# Patient Record
Sex: MALE | Race: WHITE | NOT HISPANIC OR LATINO | Employment: UNEMPLOYED | URBAN - METROPOLITAN AREA
[De-identification: names, ages, dates, MRNs, and addresses within clinical notes are randomized per-mention and may not be internally consistent; named-entity substitution may affect disease eponyms.]

---
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
## 2025-04-17 ENCOUNTER — APPOINTMENT (OUTPATIENT)
Dept: GENERAL RADIOLOGY | Facility: HOSPITAL | Age: 20
End: 2025-04-17
Payer: MEDICAID

## 2025-04-17 ENCOUNTER — HOSPITAL ENCOUNTER (EMERGENCY)
Facility: HOSPITAL | Age: 20
Discharge: HOME | End: 2025-04-17
Payer: MEDICAID

## 2025-04-17 ENCOUNTER — APPOINTMENT (OUTPATIENT)
Dept: CT IMAGING | Facility: HOSPITAL | Age: 20
End: 2025-04-17
Payer: MEDICAID

## 2025-04-17 VITALS
HEART RATE: 102 BPM | SYSTOLIC BLOOD PRESSURE: 131 MMHG | WEIGHT: 208.78 LBS | BODY MASS INDEX: 29.89 KG/M2 | RESPIRATION RATE: 16 BRPM | OXYGEN SATURATION: 96 % | HEIGHT: 70 IN | DIASTOLIC BLOOD PRESSURE: 89 MMHG | TEMPERATURE: 98.2 F

## 2025-04-17 DIAGNOSIS — M25.511 ACUTE PAIN OF RIGHT SHOULDER: ICD-10-CM

## 2025-04-17 DIAGNOSIS — D72.829 LEUKOCYTOSIS, UNSPECIFIED TYPE: ICD-10-CM

## 2025-04-17 DIAGNOSIS — S43.014A ANTERIOR DISLOCATION OF RIGHT SHOULDER, INITIAL ENCOUNTER: ICD-10-CM
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
## 2025-04-17 DIAGNOSIS — S00.93XA CONTUSION OF HEAD, UNSPECIFIED PART OF HEAD, INITIAL ENCOUNTER: ICD-10-CM

## 2025-04-17 LAB
ANION GAP SERPL CALCULATED.3IONS-SCNC: 14 MMOL/L (ref 5–15)
BACTERIA UR QL AUTO: NORMAL /HPF
BASOPHILS # BLD AUTO: 0.07 10*3/MM3 (ref 0–0.2)
BASOPHILS NFR BLD AUTO: 0.3 % (ref 0–1.5)
BILIRUB UR QL STRIP: NEGATIVE
BUN SERPL-MCNC: 12 MG/DL (ref 6–20)
BUN/CREAT SERPL: 10.6 (ref 7–25)
CALCIUM SPEC-SCNC: 9.6 MG/DL (ref 8.6–10.5)
CHLORIDE SERPL-SCNC: 102 MMOL/L (ref 98–107)
CLARITY UR: ABNORMAL
CO2 SERPL-SCNC: 22 MMOL/L (ref 22–29)
COLOR UR: YELLOW
CREAT SERPL-MCNC: 1.13 MG/DL (ref 0.76–1.27)
DEPRECATED RDW RBC AUTO: 38 FL (ref 37–54)
EGFRCR SERPLBLD CKD-EPI 2021: 96 ML/MIN/1.73
EOSINOPHIL # BLD AUTO: 0.03 10*3/MM3 (ref 0–0.4)
EOSINOPHIL NFR BLD AUTO: 0.1 % (ref 0.3–6.2)
ERYTHROCYTE [DISTWIDTH] IN BLOOD BY AUTOMATED COUNT: 12.2 % (ref 12.3–15.4)
GLUCOSE SERPL-MCNC: 111 MG/DL (ref 65–99)
GLUCOSE UR STRIP-MCNC: NEGATIVE MG/DL
HCT VFR BLD AUTO: 46.5 % (ref 37.5–51)
HGB BLD-MCNC: 15.3 G/DL (ref 13–17.7)
HGB UR QL STRIP.AUTO: ABNORMAL
HYALINE CASTS UR QL AUTO: NORMAL /LPF
IMM GRANULOCYTES # BLD AUTO: 0.18 10*3/MM3 (ref 0–0.05)
IMM GRANULOCYTES NFR BLD AUTO: 0.7 % (ref 0–0.5)
KETONES UR QL STRIP: ABNORMAL
LEUKOCYTE ESTERASE UR QL STRIP.AUTO: NEGATIVE
LYMPHOCYTES # BLD AUTO: 2.16 10*3/MM3 (ref 0.7–3.1)
LYMPHOCYTES NFR BLD AUTO: 8 % (ref 19.6–45.3)
MCH RBC QN AUTO: 28.2 PG (ref 26.6–33)
MCHC RBC AUTO-ENTMCNC: 32.9 G/DL (ref 31.5–35.7)
MCV RBC AUTO: 85.6 FL (ref 79–97)
MONOCYTES # BLD AUTO: 1.32 10*3/MM3 (ref 0.1–0.9)
MONOCYTES NFR BLD AUTO: 4.9 % (ref 5–12)
NEUTROPHILS NFR BLD AUTO: 23.11 10*3/MM3 (ref 1.7–7)
NEUTROPHILS NFR BLD AUTO: 86 % (ref 42.7–76)
NITRITE UR QL STRIP: NEGATIVE
NRBC BLD AUTO-RTO: 0 /100 WBC (ref 0–0.2)
PH UR STRIP.AUTO: 5.5 [PH] (ref 5–8)
PLATELET # BLD AUTO: 356 10*3/MM3 (ref 140–450)
PMV BLD AUTO: 9.1 FL (ref 6–12)
POTASSIUM SERPL-SCNC: 3.7 MMOL/L (ref 3.5–5.2)
PROT UR QL STRIP: ABNORMAL
RBC # BLD AUTO: 5.43 10*6/MM3 (ref 4.14–5.8)
RBC # UR STRIP: NORMAL /HPF
REF LAB TEST METHOD: NORMAL
SODIUM SERPL-SCNC: 138 MMOL/L (ref 136–145)
SP GR UR STRIP: 1.02 (ref 1–1.03)
SQUAMOUS #/AREA URNS HPF: NORMAL /HPF
UROBILINOGEN UR QL STRIP: ABNORMAL
WBC # UR STRIP: NORMAL /HPF
WBC NRBC COR # BLD AUTO: 26.87 10*3/MM3 (ref 3.4–10.8)

## 2025-04-17 PROCEDURE — 80048 BASIC METABOLIC PNL TOTAL CA: CPT

## 2025-04-17 PROCEDURE — 85025 COMPLETE CBC W/AUTO DIFF WBC: CPT

## 2025-04-17 PROCEDURE — 70450 CT HEAD/BRAIN W/O DYE: CPT

## 2025-04-17 PROCEDURE — 71045 X-RAY EXAM CHEST 1 VIEW: CPT

## 2025-04-17 PROCEDURE — 96374 THER/PROPH/DIAG INJ IV PUSH: CPT

## 2025-04-17 PROCEDURE — 25010000002 MORPHINE PER 10 MG

## 2025-04-17 PROCEDURE — 73030 X-RAY EXAM OF SHOULDER: CPT

## 2025-04-17 PROCEDURE — 96375 TX/PRO/DX INJ NEW DRUG ADDON: CPT

## 2025-04-17 PROCEDURE — 99285 EMERGENCY DEPT VISIT HI MDM: CPT

## 2025-04-17 PROCEDURE — 81001 URINALYSIS AUTO W/SCOPE: CPT

## 2025-04-17 PROCEDURE — 25010000002 ONDANSETRON PER 1 MG

## 2025-04-17 RX ORDER — HYDROCODONE BITARTRATE AND ACETAMINOPHEN 5; 325 MG/1; MG/1
1 TABLET ORAL EVERY 6 HOURS PRN
Qty: 12 TABLET | Refills: 0 | Status: SHIPPED | OUTPATIENT
Start: 2025-04-17 | End: 2025-04-20

## 2025-04-17 RX ORDER — SODIUM CHLORIDE 0.9 % (FLUSH) 0.9 %
10 SYRINGE (ML) INJECTION AS NEEDED
Status: DISCONTINUED | OUTPATIENT
Start: 2025-04-17 | End: 2025-04-18 | Stop reason: HOSPADM

## 2025-04-17 RX ORDER — ETOMIDATE 2 MG/ML
0.3 INJECTION INTRAVENOUS ONCE
Status: COMPLETED | OUTPATIENT
Start: 2025-04-17 | End: 2025-04-17

## 2025-04-17 RX ORDER — ONDANSETRON 2 MG/ML
4 INJECTION INTRAMUSCULAR; INTRAVENOUS ONCE
Status: COMPLETED | OUTPATIENT
Start: 2025-04-17 | End: 2025-04-17

## 2025-04-17 RX ORDER — HYDROCODONE BITARTRATE AND ACETAMINOPHEN 5; 325 MG/1; MG/1
1 TABLET ORAL ONCE
Refills: 0 | Status: COMPLETED | OUTPATIENT
Start: 2025-04-17 | End: 2025-04-17

## 2025-04-17 RX ADMIN — HYDROCODONE BITARTRATE AND ACETAMINOPHEN 1 TABLET: 5; 325 TABLET ORAL at 23:36

## 2025-04-17 RX ADMIN — MORPHINE SULFATE 4 MG: 4 INJECTION, SOLUTION INTRAMUSCULAR; INTRAVENOUS at 20:31

## 2025-04-17 RX ADMIN — ONDANSETRON 4 MG: 2 INJECTION, SOLUTION INTRAMUSCULAR; INTRAVENOUS at 20:31

## 2025-04-17 RX ADMIN — ETOMIDATE 20 MG: 20 INJECTION, SOLUTION INTRAVENOUS at 21:07

## 2025-04-18 NOTE — ED NOTES
Room set up for conscious sedation. O2 at 2L applied to patient with capnography. Patient placed on cardiac monitor. Bag valve mask at bedside. Suction set up and prepared.

## 2025-04-18 NOTE — DISCHARGE INSTRUCTIONS
Take the Norco as needed for pain.  This is a narcotic pain medications you do not drive or operate heavy machinery.  May use ibuprofen as well, follow package instructions.  Continue to wear the sling    Follow-up with primary care, Ortho and neurology.  Try to follow-up with your neurologist but may use Dr. Seipel if needed    Return with any new or worsening symptoms

## 2025-04-18 NOTE — ED NOTES
Patient reports having seizure and falling off bed while sleeping. Patient states he woke up when he hit the floor and his right shoulder was injured. Patient reports that he had surgery on right shoulder before. Patient can wiggle fingers on right side but cannot lift arm without pain. Patient denies numbness or tingling on RUE.

## 2025-04-18 NOTE — ED PROVIDER NOTES
Subjective   History of Present Illness  19-year-old male with history of right shoulder dislocation presents the ED today because he fell off of his bed while he was sleeping approximately 2 hours prior to arrival and feels that he redislocated his right shoulder.  Patient has dislocated his shoulder 5 times prior to this, had surgery on the shoulder in December 2024.  Reports mild tingling to the right arm due to pain.  Patient reports that his dad states he might have had a seizure however there was nobody to witness this.  Patient is on Zonegran for seizures and takes this as prescribed, last seizure was about a year ago.  Patient did hit the left side of his head on the ground, believes the fall will come up. Does report a mild headache but states it has improved.  Denies chest pain, shortness of breath, abdominal pain, dizziness, changes in vision, fever, cough/congestion, UTI symptoms    PCP: Vladimir  Ortho: [facility]        Review of Systems   Constitutional:  Negative for fever.   Respiratory:  Negative for shortness of breath.    Cardiovascular:  Negative for chest pain.   Gastrointestinal:  Negative for abdominal pain.   Musculoskeletal:  Positive for arthralgias and myalgias.   Neurological:  Positive for headaches. Negative for dizziness.       Past Medical History:   Diagnosis Date    Shoulder dislocation, recurrent, right        Allergies   Allergen Reactions    Nuts Rash    Whole Egg (Diagnostic) Hives       Past Surgical History:   Procedure Laterality Date    NO PAST SURGERIES      SHOULDER ARTHROSCOPY Right 12/18/2024    Procedure: SHOULDER ARTHROSCOPY BANKART LABRAL REPAIR;  Surgeon: Hardy Eaton MD;  Location: [facility] OR;  Service: Orthopedics;  Laterality: Right;       No family history on file.    Social History     Socioeconomic History    Marital status: Single   Tobacco Use    Smoking status: Never    Smokeless tobacco: Never   Vaping Use    Vaping status: Never Used   Substance and  "Sexual Activity    Alcohol use: Never    Drug use: Defer    Sexual activity: Defer           Objective   Physical Exam  Vitals reviewed.   HENT:      Head: Normocephalic.        Comments: Small abrasion noted to the left temporal region, no eye involvement  Eyes:      Extraocular Movements: Extraocular movements intact.      Conjunctiva/sclera: Conjunctivae normal.      Pupils: Pupils are equal, round, and reactive to light.   Cardiovascular:      Rate and Rhythm: Normal rate and regular rhythm.      Pulses: Normal pulses.      Heart sounds: Normal heart sounds.   Pulmonary:      Effort: Pulmonary effort is normal.      Breath sounds: Normal breath sounds.   Abdominal:      General: Bowel sounds are normal.      Palpations: Abdomen is soft.      Tenderness: There is no abdominal tenderness.   Musculoskeletal:         General: Tenderness present.      Comments: Tenderness to the anterior aspect of the right shoulder with deformity present.  Patient reports tingling to the right arm but has intact distal pulses and good capillary refill   Skin:     General: Skin is warm.   Neurological:      Mental Status: He is alert and oriented to person, place, and time.         Upper Extremity Dislocation    Date/Time: 4/18/2025 3:54 AM    Performed by: Jett Sosa APRN  Authorized by: Jett Sosa APRN  Consent: Verbal consent obtained  Risks and benefits: risks, benefits and alternatives were discussed  Consent given by: patient  Patient understanding: patient states understanding of the procedure being performed  Patient consent: the patient's understanding of the procedure matches consent given  Procedure consent: procedure consent matches procedure scheduled  Relevant documents: relevant documents present and verified  Site marked: the operative site was marked  Imaging studies: imaging studies available  Patient identity confirmed: verbally with patient and arm band  Time out: Immediately prior to procedure a \"time " "out\" was called to verify the correct patient, procedure, equipment, support staff and site/side marked as required.  Injury location: shoulder  Location details: right shoulder  Injury type: dislocation  Dislocation type: anterior  Chronicity: recurrent  Pre-procedure neurovascular assessment: neurovascularly intact  Pre-procedure distal perfusion: normal  Pre-procedure neurological function: normal  Pre-procedure range of motion: reduced    Anesthesia:  Local anesthesia used: no    Sedation:  Patient sedated: yes  Sedation type: moderate (conscious) sedation  Sedatives: etomidate  Analgesia: morphine  Vitals: Vital signs were monitored during sedation.    Manipulation performed: yes  Reduction method: external rotation  Reduction successful: yes  X-ray confirmed reduction: yes  Immobilization: sling  Post-procedure neurovascular assessment: post-procedure neurovascularly intact  Post-procedure distal perfusion: normal  Post-procedure neurological function: normal  Post-procedure range of motion: improved  Patient tolerance: patient tolerated the procedure well with no immediate complications                 ED Course  ED Course as of 04/18/25 0358   Thu Apr 17, 2025   2243 Awaiting UA [KB]   2309 Inspect reviewed.  Patient had Norco 7.5 filled on 12/18/2024 quantity of 20 for 5 days [KB]      ED Course User Index  [KB] Jett Sosa, APRN      /89   Pulse 102   Temp 98.2 °F (36.8 °C) (Oral)   Resp 16   Ht 177.8 cm (70\")   Wt 94.7 kg (208 lb 12.4 oz)   SpO2 96%   BMI 29.96 kg/m²   Labs Reviewed   BASIC METABOLIC PANEL - Abnormal; Notable for the following components:       Result Value    Glucose 111 (*)     All other components within normal limits    Narrative:     GFR Categories in Chronic Kidney Disease (CKD)      GFR Category          GFR (mL/min/1.73)    Interpretation  G1                     90 or greater         Normal or high (1)  G2                      60-89                Mild decrease " (1)  G3a                   45-59                Mild to moderate decrease  G3b                   30-44                Moderate to severe decrease  G4                    15-29                Severe decrease  G5                    14 or less           Kidney failure          (1)In the absence of evidence of kidney disease, neither GFR category G1 or G2 fulfill the criteria for CKD.    eGFR calculation 2021 CKD-EPI creatinine equation, which does not include race as a factor   CBC WITH AUTO DIFFERENTIAL - Abnormal; Notable for the following components:    WBC 26.87 (*)     RDW 12.2 (*)     Neutrophil % 86.0 (*)     Lymphocyte % 8.0 (*)     Monocyte % 4.9 (*)     Eosinophil % 0.1 (*)     Immature Grans % 0.7 (*)     Neutrophils, Absolute 23.11 (*)     Monocytes, Absolute 1.32 (*)     Immature Grans, Absolute 0.18 (*)     All other components within normal limits   URINALYSIS W/ MICROSCOPIC IF INDICATED (NO CULTURE) - Abnormal; Notable for the following components:    Appearance, UA Hazy (*)     Ketones, UA Trace (*)     Blood, UA Small (1+) (*)     Protein, UA 30 mg/dL (1+) (*)     All other components within normal limits   URINALYSIS, MICROSCOPIC ONLY   CBC AND DIFFERENTIAL    Narrative:     The following orders were created for panel order CBC & Differential.  Procedure                               Abnormality         Status                     ---------                               -----------         ------                     CBC Auto Differential[[id number]]        Abnormal            Final result                 Please view results for these tests on the individual orders.     Medications   morphine injection 4 mg (4 mg Intravenous Given 4/17/25 2031)   ondansetron (ZOFRAN) injection 4 mg (4 mg Intravenous Given 4/17/25 2031)   etomidate (AMIDATE) injection 28 mg (20 mg Intravenous Given 4/17/25 2107)   HYDROcodone-acetaminophen (NORCO) 5-325 MG per tablet 1 tablet (1 tablet Oral Given 4/17/25 2336)     CT Head  Without Contrast  Result Date: 4/17/2025  Impression: No acute intracranial pathology. Electronically Signed: Hany Echevarria MD  4/17/2025 10:06 PM EDT  Workstation ID: [id number]    XR Chest 1 View  Result Date: 4/17/2025  Impression: No acute cardiopulmonary disease. Electronically Signed: Hany Echevarria MD  4/17/2025 9:55 PM EDT  Workstation ID: [id number]    XR Shoulder 2+ View Right  Result Date: 4/17/2025  Impression: No acute fracture or dislocation status post reduction. Electronically Signed: Jonathan Murcia MD  4/17/2025 9:55 PM EDT  Workstation ID: [id number]    XR Shoulder 2+ View Right  Result Date: 4/17/2025  Impression: Anterior glenohumeral dislocation. Mild irregularity at the inferior glenoid which may represent an osseous Bankart injury. Electronically Signed: Jonathan Murcia MD  4/17/2025 8:46 PM EDT  Workstation ID: [id number]                                                     Medical Decision Making  Patient was seen for the above complaints.  IV was established and blood work was obtained to assess for electrolyte abnormalities or infection.  White blood cell count 26.87 without signs of infection, hemoglobin 15.3, electrolytes fairly within normal limits.  Urinalysis shows small amount of blood but no bacteria or white blood cells seen.  Head CT independently interpreted by the radiologist as no acute intracranial pathology.  Chest x-ray independently interpreted by the radiologist as no acute cardiopulmonary disease.  Initial x-ray of the right shoulder independently interpreted by radiologist as:  -Anterior glenohumeral dislocation.   -Mild irregularity at the inferior glenoid which may represent an osseous Bankart injury.   Reduction was performed, please see procedure note.  Repeat x-ray independently interpreted by the radiologist as no acute fracture or dislocation status post reduction.  Unsure why patient has leukocytosis as patient does not have signs of infection.  Patient was given morphine  and Zofran along with Norco during ER course.  On reevaluation reports he feels significantly better at this time.  Patient had arm in sling and was distally neurovascularly intact.  Instructed to follow-up with his orthopedic, primary care and neurology.  Patient had no signs of postictal state or seizure activity during ER course.  He states he is unsure if he fell off the bed or had a seizure, was unwitnessed.  Patient instructed follow-up with neurology for this.  Discussed return precautions.  [place] sent to the pharmacy for pain and instructed to wear sling until follow-up.  Patient and brother at bedside verbalized understanding are agreeable plan of care at this time.  Discussed with Dr. Black who agrees with plan of care.    I discussed findings with patient who voices understanding of discharge instructions, signs and symptoms requiring return to ED; discharged improved and in stable condition with follow up for re-evaluation.  This document is intended for medical expert use only. Reading of this document by patients and/or patient's family without participating medical staff guidance may result in misinterpretation and unintended morbidity.  Any interpretation of such data is the responsibility of the patient and/or family member responsible for the patient in concert with their primary or specialist providers, not to be left for sources of online searches such as [employer], [employer] or similar queries. Relying on these approaches to knowledge may result in misinterpretation, misguided goals of care and even death should patients or family members try recommendations outside of the realm of professional medical care in a supervised inpatient environment.       Problems Addressed:  Acute pain of right shoulder: complicated acute illness or injury  Anterior dislocation of right shoulder, initial encounter: complicated acute illness or injury  Contusion of head, unspecified part of head, initial encounter:  complicated acute illness or injury  Fall, initial encounter: complicated acute illness or injury  Leukocytosis, unspecified type: acute illness or injury    Amount and/or Complexity of Data Reviewed  Labs: ordered. Decision-making details documented in ED Course.  Radiology: ordered and independent interpretation performed. Decision-making details documented in ED Course.    Risk  Prescription drug management.        Final diagnoses:   Fall, initial encounter   Contusion of head, unspecified part of head, initial encounter   Acute pain of right shoulder   Anterior dislocation of right shoulder, initial encounter   Leukocytosis, unspecified type       ED Disposition  ED Disposition       ED Disposition   Discharge    Condition   Stable    Comment   --               Hermelindo Gilbert MD  [address]  [address]  [place] [address]  [phone]    Schedule an appointment as soon as possible for a visit       Seipel, Joseph F, MD  [address] [facility] [place] [address]  [phone]    Schedule an appointment as soon as possible for a visit       Hardy Eaton MD  [address] [address]  [phone]    Schedule an appointment as soon as possible for a visit            Medication List        New Prescriptions      HYDROcodone-acetaminophen 5-325 MG per tablet  Commonly known as: NORCO  Take 1 tablet by mouth Every 6 (Six) Hours As Needed for Severe Pain for up to 3 days.  Replaces: HYDROcodone-acetaminophen 7.5-325 MG per tablet            Stop      HYDROcodone-acetaminophen 7.5-325 MG per tablet  Commonly known as: NORCO  Replaced by: HYDROcodone-acetaminophen 5-325 MG per tablet               Where to Get Your Medications        These medications were sent to [facility] [id number] - [place], [place] - [address] - [phone]  - [phone] FX  [address] [facility] [place] [address]      Phone: [phone]   HYDROcodone-acetaminophen 5-325 MG per tablet            Ian  Jett, APRN  04/18/25 0358

## 2025-07-23 ENCOUNTER — APPOINTMENT (OUTPATIENT)
Dept: GENERAL RADIOLOGY | Facility: HOSPITAL | Age: 20
End: 2025-07-23
Payer: MEDICAID

## 2025-07-23 ENCOUNTER — HOSPITAL ENCOUNTER (EMERGENCY)
Facility: HOSPITAL | Age: 20
Discharge: HOME | End: 2025-07-23
Payer: MEDICAID

## 2025-07-23 ENCOUNTER — APPOINTMENT (OUTPATIENT)
Dept: CT IMAGING | Facility: HOSPITAL | Age: 20
End: 2025-07-23
Payer: MEDICAID

## 2025-07-23 VITALS
SYSTOLIC BLOOD PRESSURE: 123 MMHG | OXYGEN SATURATION: 94 % | HEART RATE: 86 BPM | DIASTOLIC BLOOD PRESSURE: 83 MMHG | BODY MASS INDEX: 29.89 KG/M2 | HEIGHT: 70 IN | RESPIRATION RATE: 18 BRPM | TEMPERATURE: 99.3 F | WEIGHT: 208.78 LBS

## 2025-07-23 DIAGNOSIS — R56.9 SEIZURE: ICD-10-CM

## 2025-07-23 DIAGNOSIS — S00.03XA CONTUSION OF SCALP, INITIAL ENCOUNTER: ICD-10-CM

## 2025-07-23 DIAGNOSIS — S43.014A ANTERIOR SHOULDER DISLOCATION, RIGHT, INITIAL ENCOUNTER: Primary | ICD-10-CM

## 2025-07-23 PROCEDURE — 73130 X-RAY EXAM OF HAND: CPT

## 2025-07-23 PROCEDURE — 25010000002 FENTANYL CITRATE (PF) 50 MCG/ML SOLUTION

## 2025-07-23 PROCEDURE — 96375 TX/PRO/DX INJ NEW DRUG ADDON: CPT

## 2025-07-23 PROCEDURE — 96374 THER/PROPH/DIAG INJ IV PUSH: CPT

## 2025-07-23 PROCEDURE — 25010000002 ONDANSETRON PER 1 MG: Performed by: NURSE PRACTITIONER

## 2025-07-23 PROCEDURE — 73030 X-RAY EXAM OF SHOULDER: CPT

## 2025-07-23 PROCEDURE — 99285 EMERGENCY DEPT VISIT HI MDM: CPT

## 2025-07-23 PROCEDURE — 25010000002 MIDAZOLAM PER 1 MG

## 2025-07-23 PROCEDURE — 25010000002 MORPHINE PER 10 MG: Performed by: NURSE PRACTITIONER

## 2025-07-23 PROCEDURE — 70450 CT HEAD/BRAIN W/O DYE: CPT

## 2025-07-23 PROCEDURE — 25010000002 MIDAZOLAM PER 1 MG: Performed by: NURSE PRACTITIONER

## 2025-07-23 RX ORDER — MIDAZOLAM HYDROCHLORIDE 1 MG/ML
2 INJECTION, SOLUTION INTRAMUSCULAR; INTRAVENOUS ONCE
Status: COMPLETED | OUTPATIENT
Start: 2025-07-23 | End: 2025-07-23

## 2025-07-23 RX ORDER — MIDAZOLAM HYDROCHLORIDE 1 MG/ML
INJECTION, SOLUTION INTRAMUSCULAR; INTRAVENOUS
Status: COMPLETED
Start: 2025-07-23 | End: 2025-07-23

## 2025-07-23 RX ORDER — ETOMIDATE 2 MG/ML
2 INJECTION INTRAVENOUS ONCE
Status: COMPLETED | OUTPATIENT
Start: 2025-07-23 | End: 2025-07-23

## 2025-07-23 RX ORDER — FENTANYL CITRATE 50 UG/ML
INJECTION, SOLUTION INTRAMUSCULAR; INTRAVENOUS
Status: COMPLETED
Start: 2025-07-23 | End: 2025-07-23

## 2025-07-23 RX ORDER — ONDANSETRON 2 MG/ML
4 INJECTION INTRAMUSCULAR; INTRAVENOUS ONCE
Status: COMPLETED | OUTPATIENT
Start: 2025-07-23 | End: 2025-07-23

## 2025-07-23 RX ORDER — SODIUM CHLORIDE 0.9 % (FLUSH) 0.9 %
10 SYRINGE (ML) INJECTION AS NEEDED
Status: DISCONTINUED | OUTPATIENT
Start: 2025-07-23 | End: 2025-07-24 | Stop reason: HOSPADM

## 2025-07-23 RX ORDER — ETOMIDATE 2 MG/ML
5 INJECTION INTRAVENOUS ONCE
Status: COMPLETED | OUTPATIENT
Start: 2025-07-23 | End: 2025-07-23

## 2025-07-23 RX ORDER — ETOMIDATE 2 MG/ML
14 INJECTION INTRAVENOUS ONCE
Status: COMPLETED | OUTPATIENT
Start: 2025-07-23 | End: 2025-07-23

## 2025-07-23 RX ADMIN — MIDAZOLAM 2 MG: 1 INJECTION INTRAMUSCULAR; INTRAVENOUS at 20:45

## 2025-07-23 RX ADMIN — MORPHINE SULFATE 4 MG: 4 INJECTION, SOLUTION INTRAMUSCULAR; INTRAVENOUS at 20:24

## 2025-07-23 RX ADMIN — FENTANYL CITRATE 50 MCG: 50 INJECTION, SOLUTION INTRAMUSCULAR; INTRAVENOUS at 20:47

## 2025-07-23 RX ADMIN — ETOMIDATE 14 MG: 2 INJECTION, SOLUTION INTRAVENOUS at 21:06

## 2025-07-23 RX ADMIN — ETOMIDATE 5 MG: 2 INJECTION, SOLUTION INTRAVENOUS at 20:39

## 2025-07-23 RX ADMIN — ONDANSETRON 4 MG: 2 INJECTION, SOLUTION INTRAMUSCULAR; INTRAVENOUS at 20:25

## 2025-07-23 RX ADMIN — MIDAZOLAM 2 MG: 1 INJECTION INTRAMUSCULAR; INTRAVENOUS at 20:36

## 2025-07-23 RX ADMIN — ETOMIDATE 2 MG: 2 INJECTION, SOLUTION INTRAVENOUS at 20:45

## 2025-07-23 NOTE — ED PROVIDER NOTES
Subjective   Chief Complaint   Patient presents with    Shoulder Pain     Right shoulder pain, possible dislocation Pt had seizure today and fell.         History of Present Illness  19-year-old male with a history of seizures presents the ED with parent for evaluation of injury after seizure today.  Patient had a seizure.  He was hit his head, with swelling of the back of the head, as well as to the temporal area.  Also complains of pain to his left hand and left ring finger.  Mother was also concerned he dislocated his right shoulder.  Patient has multiple shoulder dislocations previously.  Patient reports the pain is better since he is wrapped in the ED, he is able to move his shoulder.    Denies being ill.  No missed doses of medication.  Father reports has been acting normally.  It is not uncommon for him to have seizures.  Review of Systems    Past Medical History:   Diagnosis Date    Shoulder dislocation, recurrent, right        Allergies   Allergen Reactions    Nuts Rash    Whole Egg (Diagnostic) Hives       Past Surgical History:   Procedure Laterality Date    NO PAST SURGERIES      SHOULDER ARTHROSCOPY Right 12/18/2024    Procedure: SHOULDER ARTHROSCOPY [facility] LABRAL REPAIR;  Surgeon: Hardy Eaton MD;  Location: [facility] OR;  Service: Orthopedics;  Laterality: Right;       No family history on file.    Social History     Socioeconomic History    Marital status: Single   Tobacco Use    Smoking status: Never    Smokeless tobacco: Never   Vaping Use    Vaping status: Never Used   Substance and Sexual Activity    Alcohol use: Never    Drug use: Defer    Sexual activity: Defer           Objective   Physical Exam  Vitals and nursing note reviewed.   Constitutional:       Appearance: Normal appearance. He is not toxic-appearing.   HENT:      Head: Normocephalic.      Comments: Abrasion, swelling, hematoma noted to posterior head.     Mouth/Throat:      Mouth: Mucous membranes are moist.      Pharynx:  Oropharynx is clear.   Eyes:      Extraocular Movements: Extraocular movements intact.      Conjunctiva/sclera: Conjunctivae normal.      Pupils: Pupils are equal, round, and reactive to light.   Cardiovascular:      Rate and Rhythm: Normal rate and regular rhythm.      Heart sounds: Normal heart sounds. No murmur heard.     No friction rub. No gallop.   Pulmonary:      Effort: Pulmonary effort is normal.      Breath sounds: Normal breath sounds.   Musculoskeletal:      Right shoulder: Tenderness present. No bony tenderness or crepitus. Normal range of motion. Normal strength. Normal pulse.      Right wrist: No swelling, tenderness, bony tenderness, snuff box tenderness or crepitus. Normal range of motion. Normal pulse.      Right hand: Bony tenderness present. Normal strength. Normal sensation. There is no disruption of two-point discrimination. Normal capillary refill. Normal pulse.      Cervical back: Normal range of motion and neck supple. No tenderness.      Comments: Tenderness of the left ring finger.   Skin:     General: Skin is warm and dry.      Capillary Refill: Capillary refill takes less than 2 seconds.      Findings: No erythema or rash.   Neurological:      General: No focal deficit present.      Mental Status: He is alert and oriented to person, place, and time.         Upper Extremity Dislocation    Date/Time: 7/24/2025 1:32 AM    Performed by: Va Phillips APRN  Authorized by: Va Phillips APRN  Consent: The procedure was performed in an emergent situation. Verbal consent obtained. Written consent obtained  Risks and benefits: risks, benefits and alternatives were discussed  Consent given by: patient  Patient understanding: patient states understanding of the procedure being performed  Patient consent: the patient's understanding of the procedure matches consent given  Procedure consent: procedure consent matches procedure scheduled  Relevant documents: relevant documents  "present and verified  Test results: test results available and properly labeled  Site marked: the operative site was marked  Imaging studies: imaging studies available  Required items: required blood products, implants, devices, and special equipment available  Patient identity confirmed: verbally with patient, arm band, provided demographic data and hospital-assigned identification number  Time out: Immediately prior to procedure a \"time out\" was called to verify the correct patient, procedure, equipment, support staff and site/side marked as required.  Injury location: shoulder  Location details: right shoulder  Injury type: dislocation  Dislocation type: anterior  Hill-Sachs deformity: no  Chronicity: recurrent  Pre-procedure neurovascular assessment: neurovascularly intact  Pre-procedure distal perfusion: normal  Pre-procedure neurological function: normal  Pre-procedure range of motion: reduced    Anesthesia:  Local anesthesia used: no    Sedation:  Patient sedated: yes  Sedation type: moderate (conscious) sedation  Sedatives: etomidate, midazolam and fentanyl  Analgesia: morphine and fentanyl  Sedation start date/time: 7/23/2025 8:28 PM  Sedation end date/time: 7/23/2025 8:56 PM  Vitals: Vital signs were monitored during sedation.    Manipulation performed: yes  Reduction method: external rotation  Reduction successful: yes  X-ray confirmed reduction: yes  Immobilization: sling  Post-procedure neurovascular assessment: post-procedure neurovascularly intact  Post-procedure distal perfusion: normal  Post-procedure neurological function: normal  Post-procedure range of motion: unchanged  Patient tolerance: patient tolerated the procedure well with no immediate complications  Comments: Completed with Dr. Glynn at bedside.                  ED Course  ED Course as of 07/24/25 0135 Wed Jul 23, 2025   1934 Xray will repeat images [LB]   2118 Pt resting, arouses from sleep but falls back to sleep easily.  [LB]    "   ED Course User Index  [LB] Va Phillips, ERIC        XR Shoulder 2+ View Right   Final Result   Impression:   Reduction of anterior humeral dislocation         Electronically Signed: Kelvin Cantu MD     7/23/2025 9:10 PM EDT     Workstation ID: [id number]      CT Head Without Contrast   Final Result   Impression: No acute intracranial pathology.               Electronically Signed: Hany Echevarria MD     7/23/2025 6:50 PM EDT     Workstation ID: [id number]      XR Shoulder 2+ View Right   Final Result   Addendum (preliminary) 1 of 1   ADDENDUM #1   Addendum to findings and impression:      Additional images were obtained which demonstrate an ANTERIOR glenohumeral    dislocation. No visible acute fracture.      Electronically Signed: Jonathan Murcia MD     7/23/2025 7:58 PM EDT     Workstation ID: [id number]   ORIGINAL REPORT:   XR SHOULDER 2+ VW RIGHT      Date of Exam: 7/23/2025 6:21 PM EDT      Indication: seizure with injury, possible dislocation      Comparison: None available.      Findings:   Suboptimal evaluation due to patient positioning, however there is    suggestion of posterior dislocation at the glenohumeral joint. No visible    acute fracture.      No significant osteoarthrosis of the acromioclavicular or glenohumeral    joints.      Included lungs are clear.      Impression:   Suboptimal evaluation due to patient positioning, however there is    suggestion of posterior dislocation at the glenohumeral joint. No visible    acute fracture.            Electronically Signed: Jonathan Murcia MD     7/23/2025 7:15 PM EDT     Workstation ID: [id number]      Final   Impression:   Suboptimal evaluation due to patient positioning, however there is suggestion of posterior dislocation at the glenohumeral joint. No visible acute fracture.            Electronically Signed: Jonathan Murcia MD     7/23/2025 7:15 PM EDT     Workstation ID: [id number]      XR Hand 3+ View Left   Final Result   Impression:   No  acute fracture or malalignment.            Electronically Signed: Jonathan Murcia MD     7/23/2025 7:06 PM EDT     Workstation ID: [id number]                                                       Medical Decision Making  Problems Addressed:  Anterior shoulder dislocation, right, initial encounter: complicated acute illness or injury    Amount and/or Complexity of Data Reviewed  Radiology: ordered.    Risk  Prescription drug management.    Appropriate PPE worn during patient interactions.      Pt had the above evaluation.   Differential diagnoses considered for patient chief complaint and presentation, this list is not all inclusive of diagnoses considered: Fracture, contusion, dislocation.  Will patient had no fracture noted to the hand or fingers.  CT head was obtained given swelling, injury, with no acute findings.  Patient's initial shoulder x-ray, interpreted per ED attending physician, Dr. [place], reviewed possible posterior dislocation, however the films were repeated secondary to suboptimal positioning, and patient was found to have anterior shoulder dislocation.  This was reduced here in the ED.  Please see procedure note.  Patient tolerated procedure well, he was placed in sling, postprocedure x-ray reveals successful reduction.  Patient will be discharged home with his dad, they will follow-up with their orthopedist they have seen Dr. Mcgregor previously    Patient does have a history of seizures, and they are looking for a neuro provider here locally, he is given a list.  Advised to continue seizure precautions per [place] state guidelines    Considertion was given for admission, however patient has reassuring exam and workup in the ed and appears appropriate for discharge home and continued outpatient care.     We discussed my findings, plan of care, discharge instructions, the importance of follow up with their PCP/ and or specialist for repeat evaluation and to discuss any abnormal findings in labs or  imaging that warrant further outpatient evaluation. We discussed that although a definitive diagnosis is not always found in the ED, it is believed emergent conditions have been ruled out, and patient is safe for discharge at this time.  We discussed return precautions for the emergency department.  Patient verbalizes understandings, and agrees with current plan of care.        Note Disclaimer: At [facility], we believe that sharing information builds trust and better relationships. You are receiving this note because you recently visited [facility]. It is possible you will see health information before a provider has talked with you about it. This kind of information can be easy to misunderstand. To help you fully understand what it means for your health, we urge you to discuss this note with your provider  Note dictated utilizing Dragon Dictation.          Final diagnoses:   Anterior shoulder dislocation, right, initial encounter   Seizure   Contusion of scalp, initial encounter       ED Disposition  ED Disposition       ED Disposition   Discharge    Condition   Stable    Comment   --               Hermelindo Gilbert MD  [address]  [address]  [place] [address]  [phone]          [facility] EMERGENCY DEPARTMENT  [address] [address]  [phone]        Carlo Lew MD  [address]  [address]  [place] [address]  [phone]          Seipel, Joseph F, MD  [address] [place] [address]  [phone]               Medication List      No changes were made to your prescriptions during this visit.            Va Phillips, APRN  07/24/25 0135

## 2025-07-24 NOTE — DISCHARGE INSTRUCTIONS
Please follow the guidelines for seizures regarding driving, operating heavy machinery  Follow-up with your orthopedist, keep sling in place  Return to ED for new or worsening symptoms

## 2025-07-28 ENCOUNTER — OFFICE VISIT (OUTPATIENT)
Dept: FAMILY MEDICINE CLINIC | Facility: CLINIC | Age: 20
End: 2025-07-28
Payer: MEDICAID

## 2025-07-28 VITALS
OXYGEN SATURATION: 97 % | HEIGHT: 70 IN | RESPIRATION RATE: 16 BRPM | DIASTOLIC BLOOD PRESSURE: 80 MMHG | SYSTOLIC BLOOD PRESSURE: 117 MMHG | WEIGHT: 202.6 LBS | HEART RATE: 97 BPM | TEMPERATURE: 97.3 F | BODY MASS INDEX: 29.01 KG/M2

## 2025-07-28 DIAGNOSIS — G40.909 SEIZURE DISORDER: ICD-10-CM

## 2025-07-28 DIAGNOSIS — S43.004D SHOULDER DISLOCATION, RIGHT, SUBSEQUENT ENCOUNTER: Primary | ICD-10-CM

## 2025-07-28 DIAGNOSIS — L98.9 SKIN LESION OF BACK: ICD-10-CM

## 2025-07-28 PROCEDURE — 1159F MED LIST DOCD IN RCRD: CPT | Performed by: FAMILY MEDICINE

## 2025-07-28 PROCEDURE — 99213 OFFICE O/P EST LOW 20 MIN: CPT | Performed by: FAMILY MEDICINE

## 2025-07-28 PROCEDURE — 1160F RVW MEDS BY RX/DR IN RCRD: CPT | Performed by: FAMILY MEDICINE

## 2025-07-28 NOTE — PROGRESS NOTES
Subjective   Zane Catalan is a 19 y.o. male.     History of Present Illness     History of Present Illness  The patient is a 19-year-old male who presents for an ER follow-up on right shoulder dislocation and seizures.He experienced a sudden fall in his room, which resulted in the dislocation of his right shoulder. Patient has multiple shoulder dislocations previously. This incident was accompanied by a seizure, a condition he has previously experienced.  This was successfully reduced here in the ED on 7/23/25.  His last consultation with a pediatric neurologist was in 02/2025, and he is seeking a referral to an adult neurologist. The frequency of his seizures is uncertain, as he experiences both major and minor episodes.  Per father during the recent incident, he was found seizing on his back for several minutes before regaining consciousness. He is currently on zonisamide for seizure management, with no recent changes in dosage.    He reports no current pain in his right shoulder. This is not the first instance of shoulder dislocation; he has had 6 or 7 such incidents in the past year, necessitating surgery on the same shoulder in 12/2024. Despite the surgical intervention, he experienced another dislocation 3 to 4 months post-surgery.        The following portions of the patient's history were reviewed and updated as appropriate: past medical history, past social history, past surgical history and problem list.    Review of Systems   Constitutional:  Negative for fever.   Respiratory:  Negative for shortness of breath.    Musculoskeletal:  Negative for joint swelling.        Right shoulder dislocation s/p reduction in the ER   Skin:  Positive for skin lesions.        Skin lesion at tailbone   Neurological:  Positive for seizures. Negative for dizziness, headache and confusion.         Objective   Physical Exam  Vitals reviewed.   Pulmonary:      Effort: Pulmonary effort is normal.      Breath sounds: Normal  breath sounds.   Musculoskeletal:      Right shoulder: No tenderness or bony tenderness. Normal range of motion.   Skin:     Findings: Lesion present.      Comments: Skin lesion of tailbone   Neurological:      General: No focal deficit present.      Mental Status: He is alert.         Vitals:    07/28/25 0954   BP: 117/80   Pulse: 97   Resp: 16   Temp: 97.3 °F (36.3 °C)   SpO2: 97%     Current Outpatient Medications on File Prior to Visit   Medication Sig Dispense Refill    zonisamide (ZONEGRAN) 100 MG capsule Take 1 capsule by mouth 3 (Three) Times a Day.      [DISCONTINUED] docusate sodium (COLACE) 100 MG capsule Take 1 capsule by mouth 2 (Two) Times a Day As Needed for Constipation. 20 capsule 0     No current facility-administered medications on file prior to visit.         Assessment & Plan   Problems Addressed this Visit       Seizure disorder    Relevant Orders    Ambulatory Referral to Neurology    Skin lesion of back ( tailbone)    Relevant Orders    Ambulatory Referral to Dermatology    Shoulder dislocation, right, subsequent encounter-ER follow up - Primary     Diagnoses         Codes Comments      Shoulder dislocation, right, subsequent encounter-ER follow up    -  Primary ICD-10-CM: S43.004D  ICD-9-CM: V58.89       Seizure disorder     ICD-10-CM: G40.909  ICD-9-CM: 345.90       Skin lesion of back ( tailbone)     ICD-10-CM: L98.9  ICD-9-CM: 709.9             Assessment & Plan  1. Right shoulder dislocation.  - The patient experienced a right shoulder dislocation on 07/23/2025, likely due fall after a  to a seizure.  - Shoulder dislocation was reduced in the ER.  - Advised to avoid activities that may strain the shoulder and to follow up with an orthopedic specialist if any issues arise.    2. Seizure disorder.  - History of frequent seizures; currently on zonisamide.  - No changes in medication were made during the ER visit.  - Referral to an adult neurologist will be provided for further evaluation  and management.    3. Skin lesion at tailbone  - refer to dermatology for further evaluation and management.           Patient or patient representative verbalized consent for the use of Ambient Listening during the visit with  Hermelindo Gilbert MD for chart documentation. 7/28/2025  09:59 EDT

## 2025-08-13 ENCOUNTER — OFFICE VISIT (OUTPATIENT)
Dept: NEUROLOGY | Facility: CLINIC | Age: 20
End: 2025-08-13
Payer: MEDICAID

## 2025-08-13 VITALS — BODY MASS INDEX: 29.07 KG/M2 | HEIGHT: 70 IN

## 2025-08-13 DIAGNOSIS — G40.409 OTHER GENERALIZED EPILEPSY, NOT INTRACTABLE, WITHOUT STATUS EPILEPTICUS: Primary | ICD-10-CM

## 2025-08-13 RX ORDER — ZONISAMIDE 100 MG/1
400 CAPSULE ORAL DAILY
Qty: 360 CAPSULE | Refills: 3 | Status: SHIPPED | OUTPATIENT
Start: 2025-08-13

## 2025-08-13 RX ORDER — LANOLIN ALCOHOL/MO/W.PET/CERES
400 CREAM (GRAM) TOPICAL DAILY
COMMUNITY

## 2025-08-13 RX ORDER — LEVETIRACETAM 250 MG/1
TABLET ORAL
Qty: 360 TABLET | Refills: 3 | Status: SHIPPED | OUTPATIENT
Start: 2025-08-13

## 2025-08-13 RX ORDER — CHLORAL HYDRATE 500 MG
CAPSULE ORAL
COMMUNITY

## (undated) DEVICE — BLD SHAVER EXCALIBUR 4MM 13CM

## (undated) DEVICE — UNDRGLV SURG BIOGEL PIMICROINDICATOR SYNTH SZ8 LF STRL

## (undated) DEVICE — PK SHLDR ARTHROSCOPY 50

## (undated) DEVICE — STRIP,CLOSURE,WOUND,MEDI-STRIP,1/2X4: Brand: MEDLINE

## (undated) DEVICE — NEEDLE, QUINCKE, 18GX3.5": Brand: MEDLINE

## (undated) DEVICE — DISPOSABLE FEMORAL CEMENT                                    COMPRESSOR CAP STERILE

## (undated) DEVICE — PENCL SMOKE/EVAC MEGADYNE TELESCP 10FT

## (undated) DEVICE — 3M™ STERI-STRIP™ COMPOUND BENZOIN TINCTURE 40 BAGS/CARTON 4 CARTONS/CASE C1544: Brand: 3M™ STERI-STRIP™

## (undated) DEVICE — THREADED CLEAR CANNULA WITH OBTURATOR 7MM X 75MM

## (undated) DEVICE — PAD,ABDOMINAL,5"X9",STERILE,LF,1/PK: Brand: MEDLINE INDUSTRIES, INC.

## (undated) DEVICE — PCH SURG INVISISHIELD FLD/COL W/DRN/PRT 20X6IN

## (undated) DEVICE — ABL ASP APOLLORF I90 90DEG

## (undated) DEVICE — BLD SHAVER EXCALIBUR CRV 4MM

## (undated) DEVICE — DRSNG GZ CURAD XEROFORM NONADHR OVERWRAP 5X9IN

## (undated) DEVICE — GLV SURG BIOGEL LTX PF 8

## (undated) DEVICE — DYONICS 25 INFLOW/OUTFLOW TUBE                                    SET, FORKED SUCTION, 3 PER BOX

## (undated) DEVICE — ANES CIRC EXTENDAFLEX-LF: Brand: MEDLINE INDUSTRIES, INC.

## (undated) DEVICE — TUBING, SUCTION, 1/4" X 20', STRAIGHT: Brand: MEDLINE INDUSTRIES, INC.

## (undated) DEVICE — GLV SURG SENSICARE PI ORTHO SZ8 LF STRL

## (undated) DEVICE — EMERALD ARTHROSCOPY SHEET: Brand: CONVERTORS

## (undated) DEVICE — TUBING, SUCTION, 1/4" X 12', STRAIGHT: Brand: MEDLINE

## (undated) DEVICE — APPL CHLORAPREP HI/LITE 26ML ORNG

## (undated) DEVICE — TBG ARTHSCP DUALWAVE

## (undated) DEVICE — GAUZE,SPONGE,FLUFF,6"X6.75",STRL,5/TRAY: Brand: MEDLINE

## (undated) DEVICE — Device

## (undated) DEVICE — TBG PUMP ARTHSCP MAIN AR6400 16FT

## (undated) DEVICE — IDEAL SUTURE SHUTTLE, 25 DEGREES RIGHT: Brand: IDEAL

## (undated) DEVICE — THREADED CLEAR CANNULA WITH OBTURATOR 5.5MM X 75MM

## (undated) DEVICE — SPNG LAP PREWSH SFTPK 18X18IN STRL PK/5

## (undated) DEVICE — DRESSING,GAUZE,XEROFORM,CURAD,1"X8",ST: Brand: CURAD

## (undated) DEVICE — TRAP FLD MINIVAC MEGADYNE 100ML

## (undated) DEVICE — SYR LUERLOK 20CC BX/50

## (undated) DEVICE — SPNG GZ WOVN 4X4IN 12PLY 10/BX STRL

## (undated) DEVICE — TBG ARTHSCP PUMP W CONN/REDUC 8FT

## (undated) DEVICE — THE STERILE CAMERA HANDLE COVER IS FOR USE WITH THE STERIS SURGICAL LIGHTING AND VISUALIZATION SYSTEMS.

## (undated) DEVICE — SUT MNCRYL 3/0 PS2 18IN MCP497G

## (undated) DEVICE — DRAPE,U/ SHT,SPLIT,PLAS,STERIL: Brand: MEDLINE

## (undated) DEVICE — NDL HYPO PRECISIONGLIDE REG 25G 1 1/2

## (undated) DEVICE — PAD,ABDOMINAL,8"X10",ST,LF: Brand: MEDLINE

## (undated) DEVICE — MAT FLR ABSORBENT LG 4FT 10 2.5FT

## (undated) DEVICE — CLEAR-TRAC THREADED CANNULA WITH                                    OBTURATOR 8 MM I.D. X 76 MM,                                    STERILE, LATEX FREE, BOX OF 10: Brand: CLEAR-TRAC

## (undated) DEVICE — THE STERILE LIGHT HANDLE COVER IS USED WITH STERIS SURGICAL LIGHTING AND VISUALIZATION SYSTEMS.

## (undated) DEVICE — TBG ARTHSCP PT W CONN/REDUC 8FT

## (undated) DEVICE — CANN TRPL DAM 7X7MM